# Patient Record
Sex: FEMALE | Race: WHITE | Employment: OTHER | URBAN - METROPOLITAN AREA
[De-identification: names, ages, dates, MRNs, and addresses within clinical notes are randomized per-mention and may not be internally consistent; named-entity substitution may affect disease eponyms.]

---

## 2018-11-17 ENCOUNTER — HOSPITAL ENCOUNTER (EMERGENCY)
Age: 58
Discharge: HOME | End: 2018-11-17
Attending: EMERGENCY MEDICINE | Admitting: EMERGENCY MEDICINE
Payer: COMMERCIAL

## 2018-11-17 VITALS
WEIGHT: 204.59 LBS | BODY MASS INDEX: 32.88 KG/M2 | SYSTOLIC BLOOD PRESSURE: 145 MMHG | HEIGHT: 66 IN | DIASTOLIC BLOOD PRESSURE: 87 MMHG | RESPIRATION RATE: 16 BRPM | HEART RATE: 90 BPM | OXYGEN SATURATION: 98 % | TEMPERATURE: 98.3 F

## 2018-11-17 DIAGNOSIS — H10.12 ALLERGIC CONJUNCTIVITIS OF LEFT EYE: Primary | ICD-10-CM

## 2018-11-17 PROCEDURE — 74011000250 HC RX REV CODE- 250: Performed by: EMERGENCY MEDICINE

## 2018-11-17 PROCEDURE — 99283 EMERGENCY DEPT VISIT LOW MDM: CPT

## 2018-11-17 PROCEDURE — 74011250637 HC RX REV CODE- 250/637: Performed by: EMERGENCY MEDICINE

## 2018-11-17 RX ORDER — CETIRIZINE HCL 10 MG
10 TABLET ORAL
Status: COMPLETED | OUTPATIENT
Start: 2018-11-17 | End: 2018-11-17

## 2018-11-17 RX ORDER — OLOPATADINE HYDROCHLORIDE 1 MG/ML
1 SOLUTION/ DROPS OPHTHALMIC 2 TIMES DAILY
Qty: 5 ML | Refills: 0 | Status: SHIPPED | OUTPATIENT
Start: 2018-11-17 | End: 2018-11-20

## 2018-11-17 RX ORDER — TETRACAINE HYDROCHLORIDE 5 MG/ML
1 SOLUTION OPHTHALMIC
Status: COMPLETED | OUTPATIENT
Start: 2018-11-17 | End: 2018-11-17

## 2018-11-17 RX ADMIN — FLUORESCEIN SODIUM 1 STRIP: 1 STRIP OPHTHALMIC at 15:34

## 2018-11-17 RX ADMIN — CETIRIZINE HYDROCHLORIDE 10 MG: 10 TABLET, FILM COATED ORAL at 15:34

## 2018-11-17 RX ADMIN — TETRACAINE HYDROCHLORIDE 1 DROP: 5 SOLUTION OPHTHALMIC at 15:34

## 2018-11-17 NOTE — ED PROVIDER NOTES
HPI  
Pt reports abrupt onset of itching and jelling of her left eye about 1-2 hrs prior to arrival. She states that she was lying on her bed, sneezed several times and rubbed her left eye. She states that shortly after she felt the \"white area\" of her left eye thicken. Visual acuity within normal limits. She immediately removed her contact lens. Diffuse conjunctival injection; without conjunctival/scleral jelling. She took an allecare prior to arrival with minimal relief. Denies fever, cold symptoms,heaache, neck pain, visual changes, focal weakness or rash. Denies any difficulty breathing, difficulty swallowing, SOB,chest pain or abdominal pain. Denies any nausea, vomiting or diarrhea. No past medical history on file. No past surgical history on file. Family History:  
Problem Relation Age of Onset  Breast Cancer Mother 48 Social History Socioeconomic History  Marital status:  Spouse name: Not on file  Number of children: Not on file  Years of education: Not on file  Highest education level: Not on file Social Needs  Financial resource strain: Not on file  Food insecurity - worry: Not on file  Food insecurity - inability: Not on file  Transportation needs - medical: Not on file  Transportation needs - non-medical: Not on file Occupational History  Not on file Tobacco Use  Smoking status: Never Smoker Substance and Sexual Activity  Alcohol use: Not on file  Drug use: Not on file  Sexual activity: Not on file Other Topics Concern  Not on file Social History Narrative  Not on file ALLERGIES: Pcn [penicillins] Review of Systems Constitutional: Negative for activity change, appetite change, fatigue and fever. HENT: Negative for congestion. Eyes: Positive for redness and itching. Respiratory: Negative for cough and shortness of breath. Cardiovascular: Negative for chest pain. Gastrointestinal: Negative for abdominal pain. All other systems reviewed and are negative. Vitals:  
 11/17/18 1515 11/17/18 1521 BP:  145/87 Pulse: 98 90 Resp:  16 Temp:  98.3 °F (36.8 °C) SpO2: 99% 98% Weight:  92.8 kg (204 lb 9.4 oz) Height:  5' 6\" (1.676 m) Physical Exam  
Constitutional: She is oriented to person, place, and time. She appears well-developed and well-nourished. White female; non smoker HENT:  
Right Ear: External ear normal.  
Left Ear: External ear normal.  
Nose: Nose normal.  
Mouth/Throat: Oropharynx is clear and moist.  
Eyes: EOM are normal. Pupils are equal, round, and reactive to light. Visual acuity within normal limits. Mild conjunctival injection left eye; without conjunctival foreign body; with conjunctival jelling (chemosis) noted. Cornea clear; without fluorescein uptake; without foreign body. No foreign body noted with eyelid eversion. Neck: Normal range of motion. Neck supple. Cardiovascular: Normal rate and regular rhythm. Pulmonary/Chest: Effort normal and breath sounds normal.  
Abdominal: Soft. Bowel sounds are normal. She exhibits no distension and no mass. There is no tenderness. There is no rebound and no guarding. No hernia. Musculoskeletal: Normal range of motion. Neurological: She is alert and oriented to person, place, and time. Skin: Skin is warm and dry. No rash noted. Psychiatric: She has a normal mood and affect. Nursing note and vitals reviewed. MDM Procedures Eye care recommendations were reviewed with the pt. Close follow up with her eye doctor is recommended. Do not wear contact lens until recheck and symptoms have resolved. 3:51 PM 
Patient's results and plan of care have been reviewed with her.   Patient and/or family have verbally conveyed their understanding and agreement of the patient's signs, symptoms, diagnosis, treatment and prognosis and additionally agree to follow up as recommended or return to the Emergency Room should her condition change prior to follow-up. Discharge instructions have also been provided to the patient with some educational information regarding her diagnosis as well a list of reasons why she would want to return to the ER prior to her follow-up appointment should her condition change. Neeraj Bradley NP

## 2018-11-17 NOTE — DISCHARGE INSTRUCTIONS
Allergic Conjunctivitis in Teens: Care Instructions  Your Care Instructions    Allergic conjunctivitis (say \"[id number]-VY-[id number]\") is an eye problem that many teens get. It is often called pinkeye. In pinkeye, the lining of the eyelid and the eye surface become red and swollen. The lining is called the conjunctiva (say \"ulrv-pnal-TT-vuh\"). Pinkeye can be caused by bacteria, a virus, or an allergy. Your pinkeye is caused by an allergy. A substance (allergen) triggers a reaction that results in the symptoms. This type of pinkeye cannot be spread from person to person. You may have other symptoms of an allergy, such as a runny nose. Allergic pinkeye goes away when you keep away from the allergen that triggers the pinkeye. Triggers include pollen, mold, and animal skin cells (dander). But because it is not always possible to stay away from triggers, your doctor may suggest eyedrops to treat the symptoms. Antibiotics do not help with allergies. Follow-up care is a key part of your treatment and safety. Be sure to make and go to all appointments, and call your doctor if you are having problems. It's also a good idea to know your test results and keep a list of the medicines you take. How can you care for yourself at home? Use medicines as directed  · Take medicines exactly as prescribed. Call your doctor if you are having a problem with your medicine. You will get more details on the specific medicines your doctor prescribes. · If the doctor gave you eyedrops, use them as directed. Keep the bottle tip clean. To put in eyedrops:  ? Tilt your head back, and pull your lower eyelid down with one finger. ? Drop or squirt the medicine inside the lower lid. ? Close your eye for 30 to 60 seconds to let the drops move around. ? Do not touch the tip of the bottle to your eyelashes or any other surface. Make yourself comfortable  · Use moist cotton or a clean, wet cloth to remove the crust from your eyes.  Wipe from the inside corner of the eye to the outside. Use a clean part of the cloth for each wipe. · Close your eyes and put cold or warm wet cloths on them a few times a day if your eyes hurt or are itching. · Do not wear contact lenses until the pinkeye is gone. Clean the contacts and storage case. · If you wear disposable contacts, get out a new pair when your eyes have cleared and it is safe to wear contacts again. Avoid triggers  · Try to find what triggers the pinkeye. Then take steps to avoid it. For example:  ? Control animal dander and other pet allergens by keeping pets only in certain areas of your home. ? Avoid outdoor pollens by staying inside while pollen counts are high. ? Control indoor mold by cleaning bathtubs and showers monthly. When should you call for help? Call your doctor now or seek immediate medical care if:    · You have pain in an eye, not just irritation on the surface.     · You have a change in vision or a loss of vision.     · Pinkeye lasts longer than 7 days.    Watch closely for changes in your health, and be sure to contact your doctor if:    · You do not get better as expected. Where can you learn more? Go to http://jama-yanet.info/. Enter  in the search box to learn more about \"Allergic Conjunctivitis in Teens: Care Instructions. \"  Current as of: November 20, 2017  Content Version: 11.8  © 1577-9730 [employer]. Care instructions adapted under license by [employer] (which disclaims liability or warranty for this information). If you have questions about a medical condition or this instruction, always ask your healthcare professional. [address] any warranty or liability for your use of this information.